# Patient Record
Sex: MALE | Race: WHITE | Employment: STUDENT | URBAN - METROPOLITAN AREA
[De-identification: names, ages, dates, MRNs, and addresses within clinical notes are randomized per-mention and may not be internally consistent; named-entity substitution may affect disease eponyms.]

---
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
## 2024-07-30 ENCOUNTER — HOSPITAL ENCOUNTER (OUTPATIENT)
Age: 19
Discharge: HOME | End: 2024-07-30
Payer: COMMERCIAL

## 2024-07-30 VITALS
SYSTOLIC BLOOD PRESSURE: 135 MMHG | TEMPERATURE: 98 F | OXYGEN SATURATION: 100 % | HEART RATE: 85 BPM | DIASTOLIC BLOOD PRESSURE: 75 MMHG | RESPIRATION RATE: 20 BRPM
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
## 2024-07-30 DIAGNOSIS — L03.113 CELLULITIS OF RIGHT UPPER EXTREMITY: Primary | ICD-10-CM

## 2024-07-30 PROCEDURE — 99213 OFFICE O/P EST LOW 20 MIN: CPT

## 2024-07-30 PROCEDURE — 99204 OFFICE O/P NEW MOD 45 MIN: CPT

## 2024-07-30 RX ORDER — SULFAMETHOXAZOLE AND TRIMETHOPRIM 800; 160 MG/1; MG/1
1 TABLET ORAL 2 TIMES DAILY
Qty: 20 TABLET | Refills: 0 | Status: SHIPPED | OUTPATIENT
Start: 2024-07-30 | End: 2024-08-09

## 2024-07-30 NOTE — ED PROVIDER NOTES
Patient Seen in: [facility] [place]    History     Chief Complaint   Patient presents with    Bite     Stated Complaint: BUG BITE    HPI    HPI: Delon Miles is a 19 year old male who presents with chief complaint of insect bite to right upper arm.  Onset 2 days ago.  Patient reports associated erythema.  Patient states that erythematous tracking began yesterday.  Patient denies head/neck injury or pain, decreased range of motion, swelling, ecchymosis, weakness, paraesthesias, fever, chills, purulent drainage, abdominal pain, nausea, vomiting.      Past Medical History:    Anxiety state    Chondromalacia of left patella    Constipation    Other closed extra-articular fracture of distal end of left radius with routine healing, subsequent encounter       Past Surgical History:   Procedure Laterality Date    Fracture surgery Right     wrist ORIF            Family History   Problem Relation Age of Onset    Obesity Mother     Cancer Maternal Grandfather     Lipids Father        Social History     Socioeconomic History    Marital status: Single   Tobacco Use    Smoking status: Never    Smokeless tobacco: Never   Vaping Use    Vaping status: Never Used   Substance and Sexual Activity    Alcohol use: No    Drug use: No       Review of Systems    Positive for stated complaint: BUG BITE  Other systems are as noted in HPI.  Constitutional and vital signs reviewed.      All other systems reviewed and negative except as noted above.    PSFH elements reviewed from today and agreed except as otherwise stated in HPI.    Physical Exam     ED Triage Vitals [07/30/24 1824]   /75   Pulse 85   Resp 20   Temp 97.8 °F (36.6 °C)   Temp src Temporal   SpO2 100 %   O2 Device None (Room air)       Current:/75   Pulse 85   Temp 97.8 °F (36.6 °C) (Temporal)   Resp 20   SpO2 100%     PULSE OX within normal limits on room air as interpreted by this provider.    Physical Exam      Constitutional: The patient is cooperative.  Appears well-developed and well-nourished.  No acute distress.  Psychological: Alert, No abnormalities of mood, affect.  Head: Normocephalic/atraumatic.  Eyes: Pupils are equal round reactive to light.  Conjunctiva are within normal limits.  ENT: Oropharynx is clear.  Neck: The neck is supple.  No meningeal signs.  Chest: There is no tenderness to the chest wall.  Respiratory: Respiratory effort was normal.  There is no rales, wheezes, or rhonchi.  There is no stridor.  Air entry is equal.  Cardiovascular: Regular rate and rhythm.  Capillary refill is brisk.   Genitourinary: Not examined.  Lymphatic: No gross lymphadenopathy noted.  Musculoskeletal: Right upper extremity-Right upper extremity without acute bony deformity.  Positive punctate insect wound with surrounding erythema and erythematous tracking present at right upper extremity.  No purulent drainage, induration or fluctuance.  Full range of motion of right upper extremity.  Right upper extremity nontender to palpation.  Distal pulses intact.  Capillary refill normal.  Motor intact distally.  Sensory intact distally.  No ecchymosis.  No compartment syndrome.  No edema.  Remainder of musculoskeletal system is grossly intact.  There is no obvious deformity.  Neurological: Gross motor movement is intact in all 4 extremities.  Patient exhibits normal speech.  Skin: Skin is normal to inspection, except as documented.  Warm and dry.  No obvious rash.        ED Course   Labs Reviewed - No data to display    MDM     Differential diagnosis prior to work-up including but not limited to insect bite, localized allergic reaction, cellulitis    Physical exam remained stable as previously documented.  Physical exam findings discussed with patient.  Discussed with patient strict ER precautions if new symptoms develop or if current symptoms worsen despite taking prescribed oral antibiotic.    I have given the patient instructions regarding their diagnoses, expectations,  follow up, and ER precautions. I explained to the patient that emergent conditions may arise and to go to the ER for new, worsening or any persistent conditions. I've explained the importance of following up with their doctor as instructed. The patient verbalized understanding of the discharge instructions and plan.    Disposition and Plan     Clinical Impression:  1. Cellulitis of right upper extremity    2. Bug bite with infection, initial encounter        Disposition:  Discharge    Follow-up:  Kim Singleton MD  [address] [place] [address]  [phone]    Call in 1 day  For follow-up      Medications Prescribed:  Discharge Medication List as of 7/30/2024  6:32 PM        START taking these medications    Details   sulfamethoxazole-trimethoprim -160 MG Oral Tab per tablet Take 1 tablet by mouth 2 (two) times daily for 10 days., Normal, Disp-20 tablet, R-0

## 2024-07-30 NOTE — ED INITIAL ASSESSMENT (HPI)
Possible insect bite to r forearm 2 days ago, increased redness with streaking this am, no fever, warm to touch, + itching

## 2025-01-04 ENCOUNTER — LAB ENCOUNTER (OUTPATIENT)
Dept: LAB | Age: 20
End: 2025-01-04
Attending: UROLOGY
Payer: COMMERCIAL

## 2025-01-04 DIAGNOSIS — Z11.3 SCREENING EXAMINATION FOR VENEREAL DISEASE: Primary | ICD-10-CM

## 2025-01-04 PROCEDURE — 87491 CHLMYD TRACH DNA AMP PROBE: CPT

## 2025-01-06 LAB — C TRACH DNA SPEC QL NAA+PROBE: NEGATIVE

## (undated) DEVICE — SOL  .9 3000ML

## (undated) DEVICE — ARTHROSCOPY: Brand: MEDLINE INDUSTRIES, INC.

## (undated) DEVICE — ENCORE® LATEX MICRO SIZE 8.5, STERILE LATEX POWDER-FREE SURGICAL GLOVE: Brand: ENCORE

## (undated) DEVICE — SUCTION CANISTER, 3000CC,SAFELINER: Brand: DEROYAL

## (undated) DEVICE — BLADE SHVR COOLCUT 13CM 4MM

## (undated) DEVICE — DISPOSABLE TOURNIQUET CUFF SINGLE BLADDER, DUAL PORT AND QUICK CONNECT CONNECTOR: Brand: COLOR CUFF

## (undated) DEVICE — SUTURE MONOCRYL 4-0 Y845G

## (undated) DEVICE — TUBING IRR 16FT CNT WV 3 ASCP

## (undated) DEVICE — GOWN SURG AERO BLUE PERF XLG

## (undated) DEVICE — ENCORE® LATEX ACCLAIM SIZE 8.5, STERILE LATEX POWDER-FREE SURGICAL GLOVE: Brand: ENCORE

## (undated) DEVICE — 3 ML SYRINGE LUER-LOCK TIP: Brand: MONOJECT

## (undated) DEVICE — 3M™ STERI-STRIP™ REINFORCED ADHESIVE SKIN CLOSURES, R1547, 1/2 IN X 4 IN (12 MM X 100 MM), 6 STRIPS/ENVELOPE: Brand: 3M™ STERI-STRIP™

## (undated) DEVICE — STERILE TETRA-FLEX CF, ELASTIC BANDAGE, 4" X 5.5YD: Brand: TETRA-FLEX™CF